# Patient Record
Sex: MALE | Race: BLACK OR AFRICAN AMERICAN | Employment: UNEMPLOYED | URBAN - METROPOLITAN AREA
[De-identification: names, ages, dates, MRNs, and addresses within clinical notes are randomized per-mention and may not be internally consistent; named-entity substitution may affect disease eponyms.]

---

## 2019-03-01 ENCOUNTER — HOSPITAL ENCOUNTER (EMERGENCY)
Age: 24
Discharge: HOME | End: 2019-03-01
Attending: EMERGENCY MEDICINE
Payer: MEDICAID

## 2019-03-01 VITALS
RESPIRATION RATE: 12 BRPM | DIASTOLIC BLOOD PRESSURE: 85 MMHG | TEMPERATURE: 98.1 F | SYSTOLIC BLOOD PRESSURE: 139 MMHG | HEART RATE: 89 BPM | OXYGEN SATURATION: 96 % | WEIGHT: 206.79 LBS | BODY MASS INDEX: 31.34 KG/M2 | HEIGHT: 68 IN

## 2019-03-01 DIAGNOSIS — B30.9 ACUTE VIRAL CONJUNCTIVITIS OF BOTH EYES: Primary | ICD-10-CM

## 2019-03-01 PROCEDURE — 99282 EMERGENCY DEPT VISIT SF MDM: CPT

## 2019-03-01 RX ORDER — SULFACETAMIDE SODIUM 100 MG/ML
2 SOLUTION/ DROPS OPHTHALMIC
Qty: 15 ML | Refills: 0 | Status: SHIPPED | OUTPATIENT
Start: 2019-03-01 | End: 2019-03-06

## 2019-03-01 ASSESSMENT — PAIN DESCRIPTION - LOCATION
LOCATION: EYE
LOCATION: EYE

## 2019-03-01 ASSESSMENT — PAIN DESCRIPTION - ORIENTATION
ORIENTATION: RIGHT
ORIENTATION: RIGHT

## 2019-03-01 ASSESSMENT — PAIN SCALES - GENERAL
PAINLEVEL_OUTOF10: 8
PAINLEVEL_OUTOF10: 6

## 2019-03-01 ASSESSMENT — VISUAL ACUITY
OD: 20/100
OS: 20/50

## 2022-06-19 ENCOUNTER — HOSPITAL ENCOUNTER (EMERGENCY)
Age: 27
Discharge: HOME | End: 2022-06-19
Attending: EMERGENCY MEDICINE
Payer: MEDICAID

## 2022-06-19 VITALS
HEIGHT: 69 IN | DIASTOLIC BLOOD PRESSURE: 83 MMHG | RESPIRATION RATE: 12 BRPM | HEART RATE: 92 BPM | WEIGHT: 216.3 LBS | OXYGEN SATURATION: 97 % | SYSTOLIC BLOOD PRESSURE: 144 MMHG | TEMPERATURE: 98.2 F | BODY MASS INDEX: 32.04 KG/M2

## 2022-06-19 DIAGNOSIS — Z20.822 EXPOSURE TO 2019-NCOV: Primary | ICD-10-CM

## 2022-06-19 PROCEDURE — 99282 EMERGENCY DEPT VISIT SF MDM: CPT

## 2022-06-19 ASSESSMENT — PAIN - FUNCTIONAL ASSESSMENT: PAIN_FUNCTIONAL_ASSESSMENT: NONE - DENIES PAIN

## 2022-06-19 NOTE — ED NOTES
Pt dc/d with instructions in stable condition, ambulatory to lobby. Home per ride.       Jose Luis Fitch RN  06/19/22 8579

## 2022-06-19 NOTE — ED PROVIDER NOTES
[facility]   eMERGENCY dEPARTMENT eNCOUnter      Pt Name: Kendra Sykes  MRN: [id number]  [place] 1995  Date of evaluation: 6/19/2022  Provider: Octavio Biggs MD  PCP: No primary care provider on file. CHIEF COMPLAINT       Chief Complaint   Patient presents with    Other     pt brought wife in for concern of covid, he denies symptoms        HISTORY OFPRESENT ILLNESS   (Location/Symptom, Timing/Onset, Context/Setting, Quality, Duration, Modifying Factors,Severity)  Note limiting factors. Kendra Sykes is a 31 y.o. male came in to get his vital signs checked because his wife who works in the hospital was getting sick and she thinks that she might have been exposed to COVID-19 at her job he brought his daughter in just to make sure that they did not have bad vital signs not complaining of any shortness of breath not complaining of any cough or fever or sore throat or loss of taste or smell    Nursing Notes were all reviewed and agreed with or any disagreements were addressed  in the HPI. REVIEW OF SYSTEMS    (2-9 systems for level 4, 10 or more for level 5)     Review of Systems    Positives and Pertinent negatives as per HPI. Except as noted above in the ROS, all other systems were reviewed andnegative. PASTMEDICAL HISTORY   No past medical history on file. SURGICAL HISTORY     No past surgical history on file. CURRENT MEDICATIONS       Previous Medications    No medications on file       ALLERGIES     Patient has no known allergies. FAMILY HISTORY     No family history on file.        SOCIAL HISTORY       Social History     Socioeconomic History    Marital status: Single     Spouse name: Not on file    Number of children: Not on file    Years of education: Not on file    Highest education level: Not on file   Occupational History    Not on file   Tobacco Use    Smoking status: Current Every Day Smoker     Types: Cigars    Smokeless tobacco: Never Used    Tobacco comment: 5 cigars per day   Substance and Sexual Activity    Alcohol use: No    Drug use: Not on file    Sexual activity: Not on file   Other Topics Concern    Not on file   Social History Narrative    Not on file     Social Determinants of Health     Financial Resource Strain:     Difficulty of Paying Living Expenses: Not on file   Food Insecurity:     Worried About Running Out of Food in the Last Year: Not on file    Roman of Food in the Last Year: Not on file   Transportation Needs:     Lack of Transportation (Medical): Not on file    Lack of Transportation (Non-Medical): Not on file   Physical Activity:     Days of Exercise per Week: Not on file    Minutes of Exercise per Session: Not on file   Stress:     Feeling of Stress : Not on file   Social Connections:     Frequency of Communication with Friends and Family: Not on file    Frequency of Social Gatherings with Friends and Family: Not on file    Attends [employer] Services: Not on file    Active Member of [address] [place] [address] or Organizations: Not on file    Attends Club or Organization Meetings: Not on file    Marital Status: Not on file   Intimate Partner Violence:     Fear of Current or Ex-Partner: Not on file    Emotionally Abused: Not on file    Physically Abused: Not on file    Sexually Abused: Not on file   Housing Stability:     Unable to Pay for Housing in the Last Year: Not on file    Number of [place] in the Last Year: Not on file    Unstable Housing in the Last Year: Not on file       SCREENINGS      @FLOW([id number])@      PHYSICAL EXAM    (up to 7 for level 4, 8 or more for level 5)     ED Triage Vitals [06/19/22 0350]   BP Temp Temp Source Heart Rate Resp SpO2 Height Weight   (!) 144/83 98.2 °F (36.8 °C) Oral 92 12 97 % 5' 9\" (1.753 m) 216 lb 4.8 oz (98.1 kg)       Physical Exam      General Appearance:  Alert, cooperative, no distress, appears stated age.    Head:  Normocephalic, without obviousabnormality, atraumatic. Eyes:  conjunctiva/corneas clear, EOM's intact. Sclera anicteric. ENT: Mucous membranes moist.   Neck: Supple, symmetrical, trachea midline, no adenopathy. No jugular venous distention. Lungs:   Clear to auscultation bilaterally, respirationsunlabored. No rales, rhonchi or wheezes. Chest Wall:  No tenderness. Heart:  Regular rate and rhythm, S1 and S2 normal, no murmur, rub or gallop. Abdomen:   Soft, non-tender, bowel sounds active,   no masses, no organomegaly. Extremities: No edema, cords or calf tenderness. Full range of motion. Pulses: 2+ and symmetric   Skin: Turgor is normal, no rashes or lesions. Neurologic: Alert and oriented X 3. No focal findings. Motor grossly normal.  Speech clear, no drift, CN III-XII grossly intact,        DIAGNOSTIC RESULTS   LABS:    Labs Reviewed - No data to display    All other labs were within normal range or not returned as of this dictation. EKG: All EKG's are interpreted by the Emergency Department Physician who eithersigns or Co-signs this chart in the absence of a cardiologist.      RADIOLOGY:   Non-plain film images such as CT, Ultrasound and MRI are read by the radiologist. Plain radiographic images are visualized by myself. *    Interpretation per the Radiologist below, if available at the time of this note:    No orders to display         PROCEDURES   Unless otherwise noted below, none     Procedures    *    CRITICAL CARE TIME   N/A      EMERGENCY DEPARTMENT COURSE and DIFFERENTIALDIAGNOSIS/MDM:   Vitals:    Vitals:    06/19/22 0350   BP: (!) 144/83   Pulse: 92   Resp: 12   Temp: 98.2 °F (36.8 °C)   TempSrc: Oral   SpO2: 97%   Weight: 216 lb 4.8 oz (98.1 kg)   Height: 5' 9\" (1.753 m)       Patient was given thefollowing medications:  Medications - No data to display        The patient tolerated their visit well.    The patient and / or the familywere informed of the results of any tests, a time was given to answer questions. FINAL IMPRESSION      1.  Exposure to 2019-nCo          DISPOSITION/PLAN   DISPOSITION Decision To Discharge 06/19/2022 03:55:43 AM      PATIENT REFERRED TO:  [address] [address]  [phone]      As needed      DISCHARGE MEDICATIONS:  New Prescriptions    No medications on file       DISCONTINUED MEDICATIONS:  Discontinued Medications    No medications on file              (Please note that portions of this note were completed with a voice recognition program.  Efforts were made to edit the dictations but occasionally words are mis-transcribed.)    Raúl Muniz MD (electronically signed)      Raúl Muniz MD  06/19/22 8332

## 2022-10-17 ENCOUNTER — APPOINTMENT (OUTPATIENT)
Dept: GENERAL RADIOLOGY | Age: 27
End: 2022-10-17
Payer: MEDICAID

## 2022-10-17 ENCOUNTER — HOSPITAL ENCOUNTER (EMERGENCY)
Age: 27
Discharge: HOME | End: 2022-10-17
Attending: STUDENT IN AN ORGANIZED HEALTH CARE EDUCATION/TRAINING PROGRAM
Payer: MEDICAID

## 2022-10-17 VITALS
RESPIRATION RATE: 10 BRPM | WEIGHT: 220.8 LBS | HEIGHT: 68 IN | DIASTOLIC BLOOD PRESSURE: 85 MMHG | HEART RATE: 89 BPM | TEMPERATURE: 98.4 F | BODY MASS INDEX: 33.46 KG/M2 | OXYGEN SATURATION: 99 % | SYSTOLIC BLOOD PRESSURE: 143 MMHG

## 2022-10-17 DIAGNOSIS — S69.91XA INJURY OF RIGHT HAND, INITIAL ENCOUNTER: Primary | ICD-10-CM

## 2022-10-17 DIAGNOSIS — Z20.2 POSSIBLE EXPOSURE TO STD: ICD-10-CM

## 2022-10-17 PROCEDURE — 87591 N.GONORRHOEAE DNA AMP PROB: CPT

## 2022-10-17 PROCEDURE — 6360000002 HC RX W HCPCS: Performed by: STUDENT IN AN ORGANIZED HEALTH CARE EDUCATION/TRAINING PROGRAM

## 2022-10-17 PROCEDURE — 99284 EMERGENCY DEPT VISIT MOD MDM: CPT

## 2022-10-17 PROCEDURE — 87491 CHLMYD TRACH DNA AMP PROBE: CPT

## 2022-10-17 PROCEDURE — 96372 THER/PROPH/DIAG INJ SC/IM: CPT

## 2022-10-17 PROCEDURE — 73130 X-RAY EXAM OF HAND: CPT

## 2022-10-17 RX ORDER — DOXYCYCLINE HYCLATE 100 MG
100 TABLET ORAL 2 TIMES DAILY
Qty: 14 TABLET | Refills: 0 | Status: SHIPPED | OUTPATIENT
Start: 2022-10-17 | End: 2022-10-24

## 2022-10-17 RX ORDER — CEFTRIAXONE 500 MG/1
500 INJECTION, POWDER, FOR SOLUTION INTRAMUSCULAR; INTRAVENOUS ONCE
Status: COMPLETED | OUTPATIENT
Start: 2022-10-17 | End: 2022-10-17

## 2022-10-17 RX ADMIN — CEFTRIAXONE SODIUM 500 MG: 500 INJECTION, POWDER, FOR SOLUTION INTRAMUSCULAR; INTRAVENOUS at 18:51

## 2022-10-17 ASSESSMENT — PAIN SCALES - GENERAL: PAINLEVEL_OUTOF10: 8

## 2022-10-17 ASSESSMENT — PAIN DESCRIPTION - FREQUENCY: FREQUENCY: CONTINUOUS

## 2022-10-17 ASSESSMENT — PAIN DESCRIPTION - DESCRIPTORS: DESCRIPTORS: DISCOMFORT

## 2022-10-17 ASSESSMENT — PAIN DESCRIPTION - PAIN TYPE: TYPE: ACUTE PAIN

## 2022-10-17 ASSESSMENT — PAIN DESCRIPTION - ORIENTATION: ORIENTATION: RIGHT

## 2022-10-17 ASSESSMENT — PAIN DESCRIPTION - LOCATION: LOCATION: HAND

## 2022-10-17 ASSESSMENT — PAIN - FUNCTIONAL ASSESSMENT: PAIN_FUNCTIONAL_ASSESSMENT: 0-10

## 2022-10-17 NOTE — DISCHARGE INSTRUCTIONS
Please abstain from intercourse until 7 days after treatment has begun, symptoms have resolved and any partners have also been treated.

## 2022-10-17 NOTE — ED PROVIDER NOTES
[address] COMPLAINT  Hand Injury (Right hand injury wrestling with his brother)     HISTORY OF PRESENT ILLNESS  Sarah Soto is a 28 y.o. male  who presents to the ED complaining of right hand injury. Patient states that he was wrestling with his brother and injured his right hand. He is also requesting an STD check and treatment. He denies any symptoms. He denies any numbness or tingling in his hands. He denies any other complaints or concerns. No other complaints, modifying factors or associated symptoms. I have reviewed the following from the nursing documentation. History reviewed. No pertinent past medical history. History reviewed. No pertinent surgical history. History reviewed. No pertinent family history.   Social History     Socioeconomic History    Marital status: Single     Spouse name: Not on file    Number of children: Not on file    Years of education: Not on file    Highest education level: Not on file   Occupational History    Not on file   Tobacco Use    Smoking status: Every Day     Types: Cigars    Smokeless tobacco: Never    Tobacco comments:     5 cigars per day   Substance and Sexual Activity    Alcohol use: No    Drug use: Not on file    Sexual activity: Not on file   Other Topics Concern    Not on file   Social History Narrative    Not on file     Social Determinants of Health     Financial Resource Strain: Not on file   Food Insecurity: Not on file   Transportation Needs: Not on file   Physical Activity: Not on file   Stress: Not on file   Social Connections: Not on file   Intimate Partner Violence: Not on file   Housing Stability: Not on file     Current Facility-Administered Medications   Medication Dose Route Frequency Provider Last Rate Last Admin    cefTRIAXone (ROCEPHIN) injection 500 mg  500 mg IntraMUSCular Once Janice Valle MD         Current Outpatient Medications   Medication Sig Dispense Refill    doxycycline hyclate (VIBRA-TABS) 100 MG tablet Take 1 tablet by mouth 2 times daily for 7 days 14 tablet 0     No Known Allergies    REVIEW OF SYSTEMS  10 systems reviewed, pertinent positives per HPI otherwise noted to be negative. PHYSICAL EXAM  BP (!) 143/85   Pulse 89   Temp 98.4 °F (36.9 °C) (Oral)   Resp 10   Ht 5' 8\" (1.727 m)   Wt 220 lb 12.8 oz (100.2 kg)   SpO2 99%   BMI 33.57 kg/m²    GENERAL APPEARANCE: Awake and alert. Cooperative. No acute distress. HENT: Normocephalic. Atraumatic. Mucous membranes are moist.  NECK: Supple. EYES: PERRL. EOM's grossly intact. HEART/CHEST: RRR. No murmurs. LUNGS: Respirations unlabored. CTAB. Good air exchange. Speaking comfortably in full sentences. ABDOMEN: No tenderness. Soft. Non-distended. No masses. No organomegaly. No guarding or rebound. MUSCULOSKELETAL: No extremity edema. Compartments soft. No deformity. Tenderness palpation over dorsal aspect of the right first webspace of the hand, flexion and extension intact at all DIP and PIP joints of the right hand, and motor and sensation intact in M/R/U nerve distributions. No snuffbox tenderness, All extremities neurovascularly intact. SKIN: Warm and dry. No acute rashes. NEUROLOGICAL: Alert and oriented. CN's 2-12 intact. No gross facial drooping. Strength 5/5, sensation intact. Gait normal.  PSYCHIATRIC: Normal mood and affect. LABS  I have reviewed all labs for this visit. No results found for this visit on 10/17/22. RADIOLOGY  XR HAND RIGHT (MIN 3 VIEWS)   Final Result      No acute osseous abnormality. ED COURSE/MDM  Patient seen and evaluated. Old records reviewed. Labs and imaging reviewed and results discussed with patient. Patient is a 50-year-old male, presenting with concerns for right hand injury and desire for STD check and treatment. Full HPI as detailed above. Upon arrival in the ED, vitals remarkable for hypertension otherwise reassuring.   X-ray did not reveal any acute osseous abnormalities but patient will be placed in a wrist splint. Will be given referral for follow-up with PCP/orthopedics. Urine will be sent for testing for gonorrhea and chlamydia and patient is requesting treatment so he was given Rocephin here in the department and will be sent with a prescription for doxycycline and counseled on abstaining from sexual intercourse until his treatment is completed at any partners are treated. He is comfortable in agreement with plan of care and was discharged. I, Dr. Lana Argueta MD, am the primary clinician of record. Is this patient to be included in the SEP-1 Core Measure? No   Exclusion criteria - the patient is NOT to be included for SEP-1 Core Measure due to: Infection is not suspected     During the patient's ED course, the patient was given:  Medications   cefTRIAXone (ROCEPHIN) injection 500 mg (has no administration in time range)        CLINICAL IMPRESSION  1. Injury of right hand, initial encounter    2. Possible exposure to STD        Blood pressure (!) 143/85, pulse 89, temperature 98.4 °F (36.9 °C), temperature source Oral, resp. rate 10, height 5' 8\" (1.727 m), weight 220 lb 12.8 oz (100.2 kg), SpO2 99 %. DISPOSITION  Shanell Ayers was discharged to home in good condition. Patient was given scripts for the following medications. I counseled patient how to take these medications. New Prescriptions    DOXYCYCLINE HYCLATE (VIBRA-TABS) 100 MG TABLET    Take 1 tablet by mouth 2 times daily for 7 days       Follow-up with:  Byron Frederick, [address] [facility]  [phone]    Schedule an appointment as soon as possible for a visit       [address] Emergency Department  [facility] [address]  [phone]  Go to   If symptoms worsen    DISCLAIMER: This chart was created using Dragon dictation software.   Efforts were made by me to ensure accuracy, however some errors may be present due to limitations of this technology and occasionally words are not transcribed correctly.         Maikel Benavidez MD  10/17/22 6751

## 2022-10-18 LAB
C. TRACHOMATIS DNA ,URINE: NEGATIVE
N. GONORRHOEAE DNA, URINE: NEGATIVE

## 2025-01-19 ENCOUNTER — HOSPITAL ENCOUNTER (EMERGENCY)
Facility: HOSPITAL | Age: 30
Discharge: HOME | End: 2025-01-19

## 2025-01-19 VITALS
HEIGHT: 72 IN | RESPIRATION RATE: 16 BRPM | TEMPERATURE: 97.9 F | OXYGEN SATURATION: 100 % | DIASTOLIC BLOOD PRESSURE: 60 MMHG | HEART RATE: 76 BPM | SYSTOLIC BLOOD PRESSURE: 107 MMHG | WEIGHT: 170 LBS | BODY MASS INDEX: 23.03 KG/M2

## 2025-01-19 DIAGNOSIS — K08.89 PAIN, DENTAL: Primary | ICD-10-CM

## 2025-01-19 PROCEDURE — 2500000002 HC RX 250 W HCPCS SELF ADMINISTERED DRUGS (ALT 637 FOR MEDICARE OP, ALT 636 FOR OP/ED): Performed by: NURSE PRACTITIONER

## 2025-01-19 PROCEDURE — 99283 EMERGENCY DEPT VISIT LOW MDM: CPT

## 2025-01-19 RX ORDER — PENICILLIN V POTASSIUM 500 MG/1
500 TABLET, FILM COATED ORAL 4 TIMES DAILY
Qty: 40 TABLET | Refills: 0 | Status: SHIPPED | OUTPATIENT
Start: 2025-01-19 | End: 2025-01-29

## 2025-01-19 RX ORDER — PENICILLIN V POTASSIUM 250 MG/1
500 TABLET, FILM COATED ORAL ONCE
Status: COMPLETED | OUTPATIENT
Start: 2025-01-19 | End: 2025-01-19

## 2025-01-19 RX ORDER — NAPROXEN 500 MG/1
500 TABLET ORAL
Qty: 14 TABLET | Refills: 0 | Status: SHIPPED | OUTPATIENT
Start: 2025-01-19 | End: 2025-01-26

## 2025-01-19 RX ADMIN — PENICILLIN V POTASSIUM 500 MG: 250 TABLET, FILM COATED ORAL at 08:51

## 2025-01-19 ASSESSMENT — COLUMBIA-SUICIDE SEVERITY RATING SCALE - C-SSRS
6. HAVE YOU EVER DONE ANYTHING, STARTED TO DO ANYTHING, OR PREPARED TO DO ANYTHING TO END YOUR LIFE?: NO
2. HAVE YOU ACTUALLY HAD ANY THOUGHTS OF KILLING YOURSELF?: NO
1. IN THE PAST MONTH, HAVE YOU WISHED YOU WERE DEAD OR WISHED YOU COULD GO TO SLEEP AND NOT WAKE UP?: NO

## 2025-01-19 ASSESSMENT — PAIN DESCRIPTION - LOCATION: LOCATION: TEETH

## 2025-01-19 ASSESSMENT — PAIN - FUNCTIONAL ASSESSMENT: PAIN_FUNCTIONAL_ASSESSMENT: 0-10

## 2025-01-19 ASSESSMENT — LIFESTYLE VARIABLES
EVER HAD A DRINK FIRST THING IN THE MORNING TO STEADY YOUR NERVES TO GET RID OF A HANGOVER: NO
TOTAL SCORE: 0
HAVE PEOPLE ANNOYED YOU BY CRITICIZING YOUR DRINKING: NO
EVER FELT BAD OR GUILTY ABOUT YOUR DRINKING: NO
HAVE YOU EVER FELT YOU SHOULD CUT DOWN ON YOUR DRINKING: NO

## 2025-01-19 ASSESSMENT — PAIN DESCRIPTION - ORIENTATION: ORIENTATION: LEFT;UPPER

## 2025-01-19 ASSESSMENT — PAIN DESCRIPTION - PAIN TYPE: TYPE: ACUTE PAIN

## 2025-01-19 ASSESSMENT — PAIN SCALES - GENERAL: PAINLEVEL_OUTOF10: 10 - WORST POSSIBLE PAIN

## 2025-01-19 NOTE — ED TRIAGE NOTES
Pt coming in for dental pain. States that he cracked his left upper tooth. States that it has been going on for years but the pain started this morning.

## 2025-01-19 NOTE — DISCHARGE INSTRUCTIONS
You are being treated for dental possible dental infection with Pen-Vee K 4 times daily x 10 days.  First dose given in the emergency department.  Naproxen twice a day for anti-inflammatory.  Naproxen can be taken this afternoon at 3 PM.  Take Motrin or other NSAIDs with naproxen as these are the same medications.  Good mouth care.  Stay well-hydrated.  Call dentist tomorrow to set up follow-up appointment in the next 2 to 3 days.

## 2025-01-19 NOTE — ED PROVIDER NOTES
Limitations to History: None     HPI:      Yonas Tyler is a 29 y.o. with no significant PMH presenting to ED today from home by himself for evaluation of dental pain.  Reports pain in the left upper gumline from a cracked tooth.  Pain began last evening, currently rated 8/10, Tylenol/Motrin taken at 3 AM with no improvement.  Tooth has been cracked for years.  No current dentist.  Denies fever/chills, cough/cold symptoms, chest pain, shortness of breath, nausea/vomiting, abdominal pain, urinary symptoms, change in bowel habits or any other complaints.  No smoking, EtOH or IV drugs.  No PCP.    Additional History Obtained from: Triage/nursing notes reviewed     ------------------------------------------------------------------------------------------------------------------------------------------    VS: As documented in the triage note and EMR flowsheet from this visit were reviewed.    Physical Exam:  Gen: Well-appearing 29-year-old male, awake and alert, oriented x 3.  Well-nourished and hydrated.  Nontoxic looking.  Head/Neck: NCAT, neck w/ FROM.  No lymphadenopathy.  Eyes: EOMI, PERRL, anicteric sclerae, noninjected conjunctivae  Ears: TMs clear b/l without sign of infection  Nose: Nares patent w/o rhinorrhea  Mouth:  MMM, no OP lesions noted.  Decay throughout the mouth.  Multiple missing teeth.  Tooth #14 is fractured at the gumline, gingiva is erythematous, edematous and tender.  No visible abscess.  No trismus.  Normal phonation  Heart: RRR no MRG  Lungs: CTA b/l no RRW, no increased work of breathing  Abdomen: soft, NT, ND, no HSM, no palpable masses  Musculoskeletal: TERRY x 4.  MSPs intact.  Skin intact.  No deformities  Neurologic: Alert, symmetrical facies, phonates clearly, moves all extremities equally, responsive to touch, ambulates normally   Skin: Pink, warm dry.  No erythema, edema or ecchymosis.  No rashes noted  Psychological: calm, no  SI/HI      ------------------------------------------------------------------------------------------------------------------------------------------    Medical Decision Making: Healthy 29-year-old male's evaluated the bedside for dental pain.  Tooth #14 is cracked with erythematous, tender and edematous gingiva surrounding.  No visible abscess.  Vital signs within normal limits.  Afebrile.  Will treat for developing abscess with  Pen-Vee K p.o. 4 times daily x 10 days.  Naprosyn twice a day for anti-inflammatory.  Given first dose Pen-Vee K 500 mg p.o. at the bedside.  Discharged home with a list of dentist to follow-up with.  Advised to call Monday to set up follow-up appointment.  Advised to stay well-hydrated.  Return precautions and red flags discussed.   all questions were entertained and answered.  Shared decision making conducted.  Patient verbalizes understanding of diagnosis and treatment plan.  Condition stable for discharge.    Diagnoses as of 01/19/25 0846   Pain, dental       EKG interpreted by myself (ED attending physician): None    Chronic Medical Conditions Significantly Affecting Care: None    External Records Reviewed: I reviewed recent and relevant outside records including: None    Discussion of Management with Other Providers: None    I discussed the patient/results with: None       Kayla Bales, JOSHUA-CNP  01/19/25 0846